# Patient Record
Sex: MALE | Employment: OTHER | URBAN - METROPOLITAN AREA
[De-identification: names, ages, dates, MRNs, and addresses within clinical notes are randomized per-mention and may not be internally consistent; named-entity substitution may affect disease eponyms.]

---

## 2017-03-22 PROCEDURE — 82607 VITAMIN B-12: CPT | Performed by: FAMILY MEDICINE

## 2017-08-16 PROCEDURE — 82607 VITAMIN B-12: CPT | Performed by: FAMILY MEDICINE

## 2018-04-02 PROBLEM — E11.42 DIABETIC POLYNEUROPATHY ASSOCIATED WITH TYPE 2 DIABETES MELLITUS (HCC): Status: ACTIVE | Noted: 2018-04-02

## 2019-02-11 PROCEDURE — 81003 URINALYSIS AUTO W/O SCOPE: CPT | Performed by: FAMILY MEDICINE

## 2019-08-09 PROBLEM — N20.0 KIDNEY STONE ON RIGHT SIDE: Status: ACTIVE | Noted: 2019-08-01

## 2019-08-09 PROCEDURE — 82365 CALCULUS SPECTROSCOPY: CPT | Performed by: FAMILY MEDICINE

## 2022-03-15 PROBLEM — N20.0 KIDNEY STONE ON RIGHT SIDE: Status: RESOLVED | Noted: 2019-08-01 | Resolved: 2022-03-15

## 2022-03-15 PROBLEM — E11.65 TYPE 2 DIABETES MELLITUS WITH HYPERGLYCEMIA, WITHOUT LONG-TERM CURRENT USE OF INSULIN (HCC): Status: ACTIVE | Noted: 2022-03-15

## 2024-05-26 ENCOUNTER — OFFICE VISIT (OUTPATIENT)
Dept: FAMILY MEDICINE CLINIC | Facility: CLINIC | Age: 84
End: 2024-05-26

## 2024-05-26 VITALS
TEMPERATURE: 98 F | RESPIRATION RATE: 16 BRPM | SYSTOLIC BLOOD PRESSURE: 146 MMHG | DIASTOLIC BLOOD PRESSURE: 64 MMHG | BODY MASS INDEX: 29 KG/M2 | HEART RATE: 70 BPM | WEIGHT: 181 LBS | OXYGEN SATURATION: 97 %

## 2024-05-26 DIAGNOSIS — R22.0 CHEEK SWELLING: Primary | ICD-10-CM

## 2024-05-26 PROCEDURE — 1160F RVW MEDS BY RX/DR IN RCRD: CPT | Performed by: NURSE PRACTITIONER

## 2024-05-26 PROCEDURE — 1159F MED LIST DOCD IN RCRD: CPT | Performed by: NURSE PRACTITIONER

## 2024-05-26 PROCEDURE — 3078F DIAST BP <80 MM HG: CPT | Performed by: NURSE PRACTITIONER

## 2024-05-26 PROCEDURE — 3077F SYST BP >= 140 MM HG: CPT | Performed by: NURSE PRACTITIONER

## 2024-05-26 PROCEDURE — 99202 OFFICE O/P NEW SF 15 MIN: CPT | Performed by: NURSE PRACTITIONER

## 2024-05-26 RX ORDER — AMOXICILLIN 500 MG/1
500 CAPSULE ORAL 3 TIMES DAILY
Qty: 30 CAPSULE | Refills: 0 | Status: SHIPPED | OUTPATIENT
Start: 2024-05-26 | End: 2024-06-05

## 2024-05-26 RX ORDER — CHLORHEXIDINE GLUCONATE ORAL RINSE 1.2 MG/ML
15 SOLUTION DENTAL 2 TIMES DAILY
Qty: 473 ML | Refills: 0 | Status: SHIPPED | OUTPATIENT
Start: 2024-05-26 | End: 2024-06-09

## 2024-05-26 NOTE — PATIENT INSTRUCTIONS
Please start Amoxicillin 500 mg three times daily when awake with food for 10 days.  Eat yogurt or take Probiotics to help prevent upset stomach or diarrhea associated with antibiotic use.  Chlorhexadine mouthwash rinse twice daily for up to two weeks.  Motrin 600 mg every 8 hours for pain/swelling  Warm salt water gargles during the day  Soft foods for now  Follow up with dentist on Tuesday as discussed. If swelling worsens, difficulty swallowing or breathing or pain not controlled please go to the emergency room for evaluation.

## 2024-05-26 NOTE — PROGRESS NOTES
Subjective:   Patient ID: Shreyas Stone is a 84 year old male.    Swelling  This is a new problem. The current episode started yesterday. The problem occurs constantly. The problem has been gradually improving.     Patient notes dry mouth chronically due to diabetes, notes two days ago felt some pain/swelling in right lower jaw. Took Motrin with mild temporary relief, awoke this morning with large right lower cheek swelling. Improving since awakening. Denies fever, cough, difficulty swallowing or breathing. Notes increased saliva production. Able to drink and chew normally.    History/Other:   Review of Systems   HENT:          As above in HPI   Skin:         As above in HPI   All other systems reviewed and are negative.    Current Outpatient Medications   Medication Sig Dispense Refill    rosuvastatin 5 MG Oral Tab Take one tablet by mouth every day for cholesterol 90 tablet 3    FLUTICASONE PROPIONATE 50 MCG/ACT Nasal Suspension SHAKE LIQUID AND USE 2  SPRAYS IN BOTH NOSTRILS  ONCE DAILY 48 g 0    LISINOPRIL 10 MG Oral Tab TAKE 1 TABLET BY MOUTH ONCE DAILY 90 tablet 0    METFORMIN HCL 1000 MG Oral Tab TAKE 1 TABLET BY MOUTH  TWICE DAILY WITH MEALS 180 tablet 0    PIOGLITAZONE 45 MG Oral Tab TAKE 1 TABLET BY MOUTH ONCE DAILY 90 tablet 0    ACCU-CHEK SOFTCLIX LANCETS Does not apply Misc USE TO TEST BLOOD SUGAR TWICE DAILY 200 each 3    Glucose Blood (ACCU-CHEK KHRIS PLUS) In Vitro Strip USE TO TEST BLOOD SUGAR TWO TIMES DAILY 200 strip 3    GLIPIZIDE 5 MG Oral Tab TAKE 1 TABLET BY MOUTH  TWICE DAILY BEFORE MEALS 180 tablet 0    Blood Glucose Monitoring Suppl ([employer] CONTOUR NEXT MONITOR) W/DEVICE Does not apply Kit 1 each by Does not apply route 2 (two) times daily. 1 kit 0    Cyanocobalamin (VITAMIN B-12) 2500 MCG Sublingual SL Tab Place 1 tablet under the tongue daily. 1/2 po daily.      PREVACID OR None Entered       Allergies:No Known Allergies    /64   Pulse 70   Temp 98.4 °F (36.9 °C) (Oral)   Resp 16    Wt 181 lb (82.1 kg)   SpO2 97%   BMI 29.44 kg/m²       Objective:   Physical Exam  Constitutional:       General: He is not in acute distress.     Appearance: Normal appearance. He is not ill-appearing.   HENT:      Head: Normocephalic and atraumatic.        Comments: Large fluctuant non tender right lower cheek swelling with no erythema. Mild edema noted below right lip.     Mouth/Throat:      Mouth: Mucous membranes are moist.      Pharynx: Oropharynx is clear. No oropharyngeal exudate or posterior oropharyngeal erythema.        Comments: Missing dentition where marked, mild swelling of gum where marked.  No exudate noted.  Eyes:      Extraocular Movements: Extraocular movements intact.      Pupils: Pupils are equal, round, and reactive to light.   Cardiovascular:      Rate and Rhythm: Normal rate.   Pulmonary:      Effort: Pulmonary effort is normal.   Musculoskeletal:         General: Normal range of motion.      Cervical back: Normal range of motion and neck supple. No tenderness.   Lymphadenopathy:      Cervical: No cervical adenopathy.   Skin:     General: Skin is warm and dry.   Neurological:      General: No focal deficit present.      Mental Status: He is alert.   Psychiatric:         Mood and Affect: Mood normal.         Assessment & Plan:   1. Cheek swelling Acute       No orders of the defined types were placed in this encounter.      Meds This Visit:  Requested Prescriptions     Signed Prescriptions Disp Refills    amoxicillin 500 MG Oral Cap 30 capsule 0     Sig: Take 1 capsule (500 mg total) by mouth 3 (three) times daily for 10 days.    chlorhexidine gluconate 0.12 % Mouth/Throat Solution 473 mL 0     Sig: Use as directed 15 mL in the mouth or throat 2 (two) times daily for 14 days.     Patient Instructions   Please start Amoxicillin 500 mg three times daily when awake with food for 10 days.  Eat yogurt or take Probiotics to help prevent upset stomach or diarrhea associated with antibiotic  use.  Chlorhexadine mouthwash rinse twice daily for up to two weeks.  Motrin 600 mg every 8 hours for pain/swelling  Warm salt water gargles during the day  Soft foods for now  Follow up with dentist on Tuesday as discussed. If swelling worsens, difficulty swallowing or breathing or pain not controlled please go to the emergency room for evaluation.      Medication use and risk/benefit discussed. Oral care discussed. Follow up with dentist as discussed or seek emergency care for worsening symptoms. Patient verbalized understanding and agrees to plan.

## 2025-03-03 RX ORDER — OMEGA-3 FATTY ACIDS/FISH OIL 300-1000MG
1 CAPSULE ORAL DAILY
COMMUNITY

## 2025-03-03 RX ORDER — FERROUS SULFATE 325(65) MG
325 TABLET, DELAYED RELEASE (ENTERIC COATED) ORAL
COMMUNITY

## 2025-03-03 RX ORDER — MULTIVIT-MIN/IRON FUM/FOLIC AC 7.5 MG-4
1 TABLET ORAL DAILY
COMMUNITY

## 2025-03-03 RX ORDER — MAGNESIUM 200 MG
2 TABLET ORAL DAILY
COMMUNITY

## 2025-03-12 ENCOUNTER — ANESTHESIA (OUTPATIENT)
Dept: ENDOSCOPY | Facility: HOSPITAL | Age: 85
End: 2025-03-12
Payer: MEDICARE

## 2025-03-12 ENCOUNTER — ANESTHESIA EVENT (OUTPATIENT)
Dept: ENDOSCOPY | Facility: HOSPITAL | Age: 85
End: 2025-03-12
Payer: MEDICARE

## 2025-03-12 ENCOUNTER — APPOINTMENT (OUTPATIENT)
Dept: GENERAL RADIOLOGY | Facility: HOSPITAL | Age: 85
End: 2025-03-12
Attending: INTERNAL MEDICINE
Payer: MEDICARE

## 2025-03-12 ENCOUNTER — HOSPITAL ENCOUNTER (OUTPATIENT)
Facility: HOSPITAL | Age: 85
Setting detail: HOSPITAL OUTPATIENT SURGERY
Discharge: HOME | End: 2025-03-12
Attending: INTERNAL MEDICINE | Admitting: INTERNAL MEDICINE
Payer: MEDICARE

## 2025-03-12 VITALS
BODY MASS INDEX: 20.57 KG/M2 | HEIGHT: 66 IN | HEART RATE: 53 BPM | SYSTOLIC BLOOD PRESSURE: 169 MMHG | OXYGEN SATURATION: 100 % | TEMPERATURE: 98 F | WEIGHT: 128 LBS | RESPIRATION RATE: 18 BRPM | DIASTOLIC BLOOD PRESSURE: 63 MMHG

## 2025-03-12 LAB — GLUCOSE BLD-MCNC: 176 MG/DL (ref 70–99)

## 2025-03-12 PROCEDURE — 0FC98ZZ EXTIRPATION OF MATTER FROM COMMON BILE DUCT, VIA NATURAL OR ARTIFICIAL OPENING ENDOSCOPIC: ICD-10-PCS | Performed by: INTERNAL MEDICINE

## 2025-03-12 PROCEDURE — 82962 GLUCOSE BLOOD TEST: CPT

## 2025-03-12 PROCEDURE — 74328 X-RAY BILE DUCT ENDOSCOPY: CPT | Performed by: INTERNAL MEDICINE

## 2025-03-12 PROCEDURE — 0F798DZ DILATION OF COMMON BILE DUCT WITH INTRALUMINAL DEVICE, VIA NATURAL OR ARTIFICIAL OPENING ENDOSCOPIC: ICD-10-PCS | Performed by: INTERNAL MEDICINE

## 2025-03-12 DEVICE — COTTON-LEUNG BILIARY STENT
Type: IMPLANTABLE DEVICE | Status: FUNCTIONAL
Brand: COTTON-LEUNG

## 2025-03-12 RX ORDER — DEXTROSE MONOHYDRATE 25 G/50ML
50 INJECTION, SOLUTION INTRAVENOUS
Status: DISCONTINUED | OUTPATIENT
Start: 2025-03-12 | End: 2025-03-12

## 2025-03-12 RX ORDER — SODIUM CHLORIDE, SODIUM LACTATE, POTASSIUM CHLORIDE, CALCIUM CHLORIDE 600; 310; 30; 20 MG/100ML; MG/100ML; MG/100ML; MG/100ML
INJECTION, SOLUTION INTRAVENOUS CONTINUOUS
Status: DISCONTINUED | OUTPATIENT
Start: 2025-03-12 | End: 2025-03-12

## 2025-03-12 RX ORDER — NICOTINE POLACRILEX 4 MG
15 LOZENGE BUCCAL
Status: DISCONTINUED | OUTPATIENT
Start: 2025-03-12 | End: 2025-03-12

## 2025-03-12 RX ORDER — AMPICILLIN SODIUM AND SULBACTAM SODIUM 2; 1 G/1; G/1
INJECTION, POWDER, FOR SOLUTION INTRAVENOUS
Status: DISCONTINUED
Start: 2025-03-12 | End: 2025-03-12 | Stop reason: WASHOUT

## 2025-03-12 RX ORDER — INDOMETHACIN 100 MG
100 SUPPOSITORY, RECTAL RECTAL ONCE
Status: DISCONTINUED | OUTPATIENT
Start: 2025-03-12 | End: 2025-03-12

## 2025-03-12 RX ORDER — INDOMETHACIN 50 MG/1
SUPPOSITORY RECTAL
Status: DISCONTINUED | OUTPATIENT
Start: 2025-03-12 | End: 2025-03-12

## 2025-03-12 RX ORDER — INDOMETHACIN 100 MG
SUPPOSITORY, RECTAL RECTAL
Status: DISCONTINUED
Start: 2025-03-12 | End: 2025-03-12

## 2025-03-12 RX ORDER — LEVOFLOXACIN 5 MG/ML
INJECTION, SOLUTION INTRAVENOUS
Status: DISCONTINUED
Start: 2025-03-12 | End: 2025-03-12 | Stop reason: WASHOUT

## 2025-03-12 RX ORDER — SODIUM CHLORIDE, SODIUM LACTATE, POTASSIUM CHLORIDE, CALCIUM CHLORIDE 600; 310; 30; 20 MG/100ML; MG/100ML; MG/100ML; MG/100ML
100 INJECTION, SOLUTION INTRAVENOUS CONTINUOUS
Status: DISCONTINUED | OUTPATIENT
Start: 2025-03-12 | End: 2025-03-12

## 2025-03-12 RX ORDER — SODIUM CHLORIDE, SODIUM LACTATE, POTASSIUM CHLORIDE, CALCIUM CHLORIDE 600; 310; 30; 20 MG/100ML; MG/100ML; MG/100ML; MG/100ML
INJECTION, SOLUTION INTRAVENOUS CONTINUOUS PRN
Status: DISCONTINUED | OUTPATIENT
Start: 2025-03-12 | End: 2025-03-12 | Stop reason: SURG

## 2025-03-12 RX ORDER — LIDOCAINE HYDROCHLORIDE 10 MG/ML
INJECTION, SOLUTION EPIDURAL; INFILTRATION; INTRACAUDAL; PERINEURAL AS NEEDED
Status: DISCONTINUED | OUTPATIENT
Start: 2025-03-12 | End: 2025-03-12 | Stop reason: SURG

## 2025-03-12 RX ORDER — NICOTINE POLACRILEX 4 MG
30 LOZENGE BUCCAL
Status: DISCONTINUED | OUTPATIENT
Start: 2025-03-12 | End: 2025-03-12

## 2025-03-12 RX ADMIN — LIDOCAINE HYDROCHLORIDE 50 MG: 10 INJECTION, SOLUTION EPIDURAL; INFILTRATION; INTRACAUDAL; PERINEURAL at 14:27:00

## 2025-03-12 RX ADMIN — SODIUM CHLORIDE, SODIUM LACTATE, POTASSIUM CHLORIDE, CALCIUM CHLORIDE: 600; 310; 30; 20 INJECTION, SOLUTION INTRAVENOUS at 14:21:00

## 2025-03-12 NOTE — ANESTHESIA PREPROCEDURE EVALUATION
PRE-OP EVALUATION    Patient Name: Shreyas Stone    Admit Diagnosis: CHOLEDOCHOLITHIASIS    Pre-op Diagnosis: CHOLEDOCHOLITHIASIS    ENDOSCOPIC RETROGRADE CHOLANGIOPANCREATOGRAPHY (ERCP) WITH FLUOROSCOPY    Anesthesia Procedure: ENDOSCOPIC RETROGRADE CHOLANGIOPANCREATOGRAPHY (ERCP) WITH FLUOROSCOPY    Surgeons and Role:     * Severino Mittal MD - Primary    Pre-op vitals reviewed.  Temp: 97.8 °F (36.6 °C)  Pulse: 64  Resp: 18  BP: 131/63  SpO2: 100 %  Body mass index is 20.66 kg/m².    Current medications reviewed.  Hospital Medications:   glucose (Dex4) 15 GM/59ML oral liquid 15 g  15 g Oral Q15 Min PRN    Or    glucose (Glutose) 40% oral gel 15 g  15 g Oral Q15 Min PRN    Or    glucose-vitamin C (Dex-4) chewable tab 4 tablet  4 tablet Oral Q15 Min PRN    Or    dextrose 50% injection 50 mL  50 mL Intravenous Q15 Min PRN    Or    glucose (Dex4) 15 GM/59ML oral liquid 30 g  30 g Oral Q15 Min PRN    Or    glucose (Glutose) 40% oral gel 30 g  30 g Oral Q15 Min PRN    Or    glucose-vitamin C (Dex-4) chewable tab 8 tablet  8 tablet Oral Q15 Min PRN    lactated ringers infusion   Intravenous Continuous    ampicillin-sulbactam (Unasyn) 3 g in sodium chloride 0.9% 100mL IVPB-ADD  3 g Intravenous Once    lactated ringers IV bolus 1,000 mL  1,000 mL Intravenous Once    Followed by    lactated ringers infusion  100 mL/hr Intravenous Continuous    indomethacin (Indocin) 100 MG rectal suppository 100 mg  100 mg Rectal Once    indomethacin (Indocin) 100 MG rectal suppository           Outpatient Medications:   Prescriptions Prior to Admission[1]    Allergies: Patient has no known allergies.      Anesthesia Evaluation    Patient summary reviewed.    Anesthetic Complications  (-) history of anesthetic complications         GI/Hepatic/Renal      (+) GERD                           Cardiovascular        Exercise tolerance: good     MET: >4      (+) hypertension   (+) hyperlipidemia                                   Endo/Other      (+) diabetes  type 2, not using insulin                         Pulmonary                    (+) sleep apnea       Neuro/Psych    Negative neuro/psych ROS.                                  Past Surgical History:   Procedure Laterality Date    Colonoscopy  2010    normal;     Colonoscopy  8-    divertic, int hem; repeat in 10 yrs    Other surgical history      circumcision    Upper gi endoscopy - referral  2010    H pylori    Upper gi endoscopy - referral  8-2014, HH    [facility]? Dr Ling     Social History     Socioeconomic History    Marital status:    Tobacco Use    Smoking status: Former     Current packs/day: 0.50     Average packs/day: 0.5 packs/day for 10.0 years (5.0 ttl pk-yrs)     Types: Cigarettes    Smokeless tobacco: Never    Tobacco comments:     quit over 20 years   Vaping Use    Vaping status: Never Used   Substance and Sexual Activity    Alcohol use: Yes     Comment: occasional    Drug use: No     History   Drug Use No     Available pre-op labs reviewed.               Airway      Mallampati: II  Mouth opening: >3 FB  TM distance: > 6 cm  Neck ROM: full Cardiovascular    Cardiovascular exam normal.  Rhythm: regular  Rate: normal     Dental    Dentition appears grossly intact         Pulmonary    Pulmonary exam normal.  Breath sounds clear to auscultation bilaterally.               Other findings              ASA: 2   Plan: MAC  NPO status verified and patient meets guidelines.  Patient has not taken beta blockers in last 24 hours.  Post-procedure pain management plan discussed with surgeon and patient.      Plan/risks discussed with: patient                Present on Admission:  **None**             [1]   Medications Prior to Admission   Medication Sig Dispense Refill Last Dose/Taking    Omega 3 1000 MG Oral Cap Take 1 capsule by mouth daily.   3/11/2025 Morning    Magnesium 200 MG Oral Tab Take 2 tablets (400 mg total) by mouth daily.   3/11/2025 Morning    Multiple  Vitamins-Minerals (MULTI-VITAMIN/MINERALS) Oral Tab Take 1 tablet by mouth daily.   3/11/2025 Morning    ferrous sulfate 325 (65 FE) MG Oral Tab EC Take 1 tablet (325 mg total) by mouth daily with breakfast.   3/11/2025 Morning    rosuvastatin 5 MG Oral Tab Take one tablet by mouth every day for cholesterol 90 tablet 3 3/11/2025 Bedtime    FLUTICASONE PROPIONATE 50 MCG/ACT Nasal Suspension SHAKE LIQUID AND USE 2  SPRAYS IN BOTH NOSTRILS  ONCE DAILY (Patient taking differently: SHAKE LIQUID AND USE 2  SPRAYS IN BOTH NOSTRILS ONCE DAILY PRN) 48 g 0 Past Month    LISINOPRIL 10 MG Oral Tab TAKE 1 TABLET BY MOUTH ONCE DAILY 90 tablet 0 3/12/2025    METFORMIN HCL 1000 MG Oral Tab TAKE 1 TABLET BY MOUTH  TWICE DAILY WITH MEALS 180 tablet 0 3/11/2025 Morning    PIOGLITAZONE 45 MG Oral Tab TAKE 1 TABLET BY MOUTH ONCE DAILY 90 tablet 0 3/11/2025 Morning    GLIPIZIDE 5 MG Oral Tab TAKE 1 TABLET BY MOUTH  TWICE DAILY BEFORE MEALS 180 tablet 0 3/11/2025 Morning    Blood Glucose Monitoring Suppl (TG CONTOUR NEXT MONITOR) W/DEVICE Does not apply Kit 1 each by Does not apply route 2 (two) times daily. 1 kit 0 3/11/2025    Cyanocobalamin (VITAMIN B-12) 2500 MCG Sublingual SL Tab Place 1 tablet under the tongue daily. 1/2 po daily.   3/11/2025 Morning    PREVACID OR Take 1 capsule by mouth daily.   3/11/2025 Morning    ACCU-CHEK SOFTCLIX LANCETS Does not apply Misc USE TO TEST BLOOD SUGAR TWICE DAILY 200 each 3     Glucose Blood (ACCU-CHEK KHRIS PLUS) In Vitro Strip USE TO TEST BLOOD SUGAR TWO TIMES DAILY 200 strip 3

## 2025-03-12 NOTE — H&P
History & Physical Examination    Patient Name: Shreyas Stone  MRN: [id number]  CSN: [id number]  YOB: 1940    Diagnosis: CHOLEDOCHOLITHIASIS        Present Illness:  Shreyas Stone is a 84 year old male is here CHOLEDOCHOLITHIASIS.    Body mass index is 20.66 kg/m².    Past Medical History:    DIABETES    Diabetes (HCC)    Erectile dysfunction    GERD (gastroesophageal reflux disease)    [facility]? Dr Ling    Hearing impaired person, bilateral    left side hearing loss    High blood pressure    High cholesterol    History of anemia    iron def - etiology ?    HYPERLIPIDEMIA    Hypertension    Impotence of organic origin    Iron deficiency anemia, unspecified    Kidney stone on right side    Restless leg syndrome     ?Rey    SLEEP APNEA    Sleep apnea    no device or oral appliance    Visual impairment    glasses    Vitamin B12 deficiency neuropathy (HCC)       Procedure: ERCP    Physician Pre-Sedation Assessment    Pre-Sedation Assessment:    Sedation History: Airway Assessed    ASA Classification: 2. Patient with mild systemic disease    Cardiac:    Respiratory:    Abdomen:      Plan: MAC        Current Facility-Administered Medications   Medication Dose Route Frequency    indomethacin (Indocin) 100 MG rectal suppository        glucose (Dex4) 15 GM/59ML oral liquid 15 g  15 g Oral Q15 Min PRN    Or    glucose (Glutose) 40% oral gel 15 g  15 g Oral Q15 Min PRN    Or    glucose-vitamin C (Dex-4) chewable tab 4 tablet  4 tablet Oral Q15 Min PRN    Or    dextrose 50% injection 50 mL  50 mL Intravenous Q15 Min PRN    Or    glucose (Dex4) 15 GM/59ML oral liquid 30 g  30 g Oral Q15 Min PRN    Or    glucose (Glutose) 40% oral gel 30 g  30 g Oral Q15 Min PRN    Or    glucose-vitamin C (Dex-4) chewable tab 8 tablet  8 tablet Oral Q15 Min PRN    lactated ringers infusion   Intravenous Continuous    ampicillin-sulbactam (Unasyn) 3 g in sodium chloride 0.9% 100mL IVPB-ADD  3 g Intravenous Once    lactated  ringers IV bolus 1,000 mL  1,000 mL Intravenous Once    Followed by    lactated ringers infusion  100 mL/hr Intravenous Continuous    indomethacin (Indocin) 100 MG rectal suppository 100 mg  100 mg Rectal Once       Allergies: Allergies[1]    Past Surgical History:   Procedure Laterality Date    Colonoscopy  2010    normal;     Colonoscopy  8-    divertic, int hem; repeat in 10 yrs    Other surgical history      circumcision    Upper gi endoscopy - referral  2010    H pylori    Upper gi endoscopy - referral  8-2014, HH    [facility]? Dr Ling     Family History   Problem Relation Age of Onset    Diabetes Maternal Grandmother     Diabetes Maternal Grandfather     Diabetes Other     Cancer Neg     Hypertension Neg     Lipids Neg     Heart Disorder Neg     Stroke Neg      Social History     Tobacco Use    Smoking status: Former     Current packs/day: 0.50     Average packs/day: 0.5 packs/day for 10.0 years (5.0 ttl pk-yrs)     Types: Cigarettes    Smokeless tobacco: Never    Tobacco comments:     quit over 20 years   Substance Use Topics    Alcohol use: Yes     Comment: occasional       SYSTEM Check if Review is Normal Check if Physical Exam is Normal If not normal, please explain:   HEENT [x ] [x ]    NECK & BACK [x ] [x ]    HEART [x ] [x ]    LUNGS [x ] [x ]    ABDOMEN [x ] [x ]    UROGENITAL [ ] [ ]    EXTREMITIES [ ] [ ]    OTHER        [ x ] I have discussed the risks and benefits and alternatives with the patient/family.  They understand and agree to proceed with plan of care.  [ x ] I have reviewed the History and Physical done within the last 30 days.  Any changes noted above.    Severino Mittal MD  3/12/2025  2:03 PM             [1] No Known Allergies

## 2025-03-12 NOTE — ANESTHESIA POSTPROCEDURE EVALUATION
[facility]    Shreyas Stone Patient Status:  Hospital Outpatient Surgery   Age/Gender 84 year old male MRN [id number]   Location [facility] Attending Severino Mittal MD   Hosp Day # 0 PCP Adryan Rodarte MD       Anesthesia Post-op Note    ENDOSCOPIC RETROGRADE CHOLANGIOPANCREATOGRAPHY (ERCP) WITH SPHINCTEROTOMY, BALLOON SWEEP, 6-8MM DILATION, TRAPEZOID BASKET STONE REMOVAL    Procedure Summary       Date: 03/12/25 Room / Location:  ENDOSCOPY 01 /  ENDOSCOPY    Anesthesia Start: 1421 Anesthesia Stop: 1542    Procedure: ENDOSCOPIC RETROGRADE CHOLANGIOPANCREATOGRAPHY (ERCP) WITH SPHINCTEROTOMY, BALLOON SWEEP, 6-8MM DILATION, TRAPEZOID BASKET STONE REMOVAL Diagnosis: (CHOLEDOCOLITHASIS)    Surgeons: Severino Mittal MD Anesthesiologist: Russ Moran MD    Anesthesia Type: MAC ASA Status: 2            Anesthesia Type: MAC    Vitals Value Taken Time   /58 03/12/25 1542        Pulse 60 03/12/25 1542   Resp 16 03/12/25 1542   SpO2 100 % 03/12/25 1542   Vitals shown include unfiled device data.        Patient Location: Endoscopy    Anesthesia Type: MAC    Airway Patency: patent    Postop Pain Control: adequate    Mental Status: mildly sedated but able to meaningfully participate in the post-anesthesia evaluation    Nausea/Vomiting: none    Cardiopulmonary/Hydration status: stable euvolemic    Complications: no apparent anesthesia related complications    Postop vital signs: stable    Dental Exam: Unchanged from Preop    Patient to be discharged home when criteria met.

## 2025-03-13 NOTE — OPERATIVE REPORT
[facility] OPERATIVE REPORT   PATIENT NAME: Shreyas Stone  MRN: [id number]  DATE OF OPERATION: 3/12/2025  PREOPERATIVE DIAGNOSIS: upper abdominal pain, dilated CBD with multiple stones in CBD c/w choledocholithiasis; cholelithiasis   POSTOPERATIVE DIAGNOSIS:    1.  Severely redundant duodenal folds made if difficult to find the papilla   2.  Selective biliary cannulation, dilated CBD with large CBD stones   3.  Sphincterotomy, balloon papillotomy to 8mm, mechanical lithotripsy, stone extraction with basket and balloon  PROCEDURE PERFORMED: Endoscopic Retrograde Cholangiopancreatoscopy   SEDATION MEDICATIONS: MAC  PREOPERATIVE MEDS:  Levofloxacin 500 mg IVPB;  500cc lactated Ringer's solution IV  INTRAPROCEDURE MEDS:  indomethacin 100 mg Per rectum  PREPROCEDURE ASSESSMENT: The indication for this procedure is to assess for choledocholithiasis. The patient was identified by myself and nursing staff in the exam room. Informed consent was obtained. The patient was seen in clinic and a full H&P was obtained. On brief physical examination, airway is patent. Chest is clear. Heart has regular rate and rhythm. Abdomen is soft, nontender with good bowel sounds. A medication list was taken by nursing today and reviewed by myself. The patient is an ASA grade 2.   PROCEDURE NOTE: The procedure was completed with difficulty. The patient tolerated the procedure well.   The side-viewing duodenoscope was introduced into the esophagus and advanced to the 2nd portion. Ampulla was hard to find due to redundant folds. After much time, bile drainage was noted under buried folds. Folds were teased apart to find the papilla. Ampulla was normal in appearance. Biliary cannulation was made with autotome with 035\" wire. Contrast injection revealed a very dilated CBD of 15mm. Large stones were appreciated- largest was 10 x 20mm.  Sphincterotomy was carefully made and due to redundant folds, it was hard to know the extent of duodenal wall -  balloon papillotomy was performed to facilitate stone removal starting from 6 mm to 8mm.  A 2.5cm trapezoid basket was used to capture and crush the largest stone. Fragments were removed with open basket sweep. Most stones fragments were removed this way. Smaller stones were removed with 12-15mm. All stones were removed. Occlusion cholangiogram was performed and no filling defects were noted.  Excellent biliary drainage was noted.  No contrast was noted in the cystic duct and pancreatic duct. A 10fr x 9cm bilairy stent was placed to allow drainage of bile. Scope was withdrawn from the patient and patient tolerated the procedure well.  FINDINGS   Choledocholithiasis s/p stone extraction   Stent was placed to prevent post extraction edema and subsequent biliary obstruction   RECOMMENDATIONS: proceed with cholecystectomy with Dr. Gross.  Repeat ERCP in 6-8 weeks to remove the stent  DISCHARGE:  On discharge, the patient was given an after-visit summary detailing the procedure, findings, followup plans, and an updated medication list.     Thank you very much for the consultation.  I really appreciate it.    Severino Mittal MD

## 2025-03-20 ENCOUNTER — LABORATORY ENCOUNTER (OUTPATIENT)
Dept: LAB | Age: 85
End: 2025-03-20
Attending: SURGERY
Payer: MEDICARE

## 2025-03-20 ENCOUNTER — EKG ENCOUNTER (OUTPATIENT)
Dept: LAB | Age: 85
End: 2025-03-20
Attending: SURGERY
Payer: MEDICARE

## 2025-03-20 DIAGNOSIS — Z01.818 PRE-OP TESTING: ICD-10-CM

## 2025-03-20 DIAGNOSIS — E11.65 TYPE 2 DIABETES MELLITUS WITH HYPERGLYCEMIA, WITHOUT LONG-TERM CURRENT USE OF INSULIN (HCC): ICD-10-CM

## 2025-03-20 LAB
ATRIAL RATE: 72 BPM
ERYTHROCYTE [DISTWIDTH] IN BLOOD BY AUTOMATED COUNT: 13.1 %
HCT VFR BLD AUTO: 43.9 %
HGB BLD-MCNC: 14.3 G/DL
MCH RBC QN AUTO: 30.4 PG (ref 26–34)
MCHC RBC AUTO-ENTMCNC: 32.6 G/DL (ref 31–37)
MCV RBC AUTO: 93.4 FL
P AXIS: 49 DEGREES
P-R INTERVAL: 186 MS
PLATELET # BLD AUTO: 292 10(3)UL (ref 150–450)
Q-T INTERVAL: 374 MS
QRS DURATION: 84 MS
QTC CALCULATION (BEZET): 409 MS
R AXIS: 32 DEGREES
RBC # BLD AUTO: 4.7 X10(6)UL
T AXIS: 26 DEGREES
VENTRICULAR RATE: 72 BPM
WBC # BLD AUTO: 5.8 X10(3) UL (ref 4–11)

## 2025-03-20 PROCEDURE — 93005 ELECTROCARDIOGRAM TRACING: CPT

## 2025-03-20 PROCEDURE — 36415 COLL VENOUS BLD VENIPUNCTURE: CPT

## 2025-03-20 PROCEDURE — 93010 ELECTROCARDIOGRAM REPORT: CPT | Performed by: INTERNAL MEDICINE

## 2025-03-20 PROCEDURE — 85027 COMPLETE CBC AUTOMATED: CPT

## 2025-03-31 ENCOUNTER — HOSPITAL ENCOUNTER (OUTPATIENT)
Facility: HOSPITAL | Age: 85
Setting detail: HOSPITAL OUTPATIENT SURGERY
Discharge: HOME | End: 2025-03-31
Attending: SURGERY | Admitting: SURGERY
Payer: MEDICARE

## 2025-03-31 ENCOUNTER — ANESTHESIA EVENT (OUTPATIENT)
Dept: SURGERY | Facility: HOSPITAL | Age: 85
End: 2025-03-31
Payer: MEDICARE

## 2025-03-31 ENCOUNTER — ANESTHESIA (OUTPATIENT)
Dept: SURGERY | Facility: HOSPITAL | Age: 85
End: 2025-03-31
Payer: MEDICARE

## 2025-03-31 VITALS
SYSTOLIC BLOOD PRESSURE: 114 MMHG | HEIGHT: 67 IN | TEMPERATURE: 97 F | RESPIRATION RATE: 16 BRPM | DIASTOLIC BLOOD PRESSURE: 58 MMHG | HEART RATE: 63 BPM | BODY MASS INDEX: 27 KG/M2 | OXYGEN SATURATION: 97 % | WEIGHT: 172 LBS

## 2025-03-31 DIAGNOSIS — Z01.818 PRE-OP TESTING: Primary | ICD-10-CM

## 2025-03-31 DIAGNOSIS — R52 PAIN: ICD-10-CM

## 2025-03-31 DIAGNOSIS — E11.65 TYPE 2 DIABETES MELLITUS WITH HYPERGLYCEMIA, WITHOUT LONG-TERM CURRENT USE OF INSULIN (HCC): ICD-10-CM

## 2025-03-31 LAB
GLUCOSE BLD-MCNC: 180 MG/DL (ref 70–99)
GLUCOSE BLD-MCNC: 240 MG/DL (ref 70–99)

## 2025-03-31 PROCEDURE — 82962 GLUCOSE BLOOD TEST: CPT

## 2025-03-31 PROCEDURE — 88304 TISSUE EXAM BY PATHOLOGIST: CPT | Performed by: SURGERY

## 2025-03-31 PROCEDURE — 0FT44ZZ RESECTION OF GALLBLADDER, PERCUTANEOUS ENDOSCOPIC APPROACH: ICD-10-PCS | Performed by: SURGERY

## 2025-03-31 PROCEDURE — BF53200 OTHER IMAGING OF GALLBLADDER AND BILE DUCTS USING FLUORESCING AGENT, INDOCYANINE GREEN DYE, INTRAOPERATIVE: ICD-10-PCS | Performed by: SURGERY

## 2025-03-31 PROCEDURE — 8E0W4CZ ROBOTIC ASSISTED PROCEDURE OF TRUNK REGION, PERCUTANEOUS ENDOSCOPIC APPROACH: ICD-10-PCS | Performed by: SURGERY

## 2025-03-31 RX ORDER — NICOTINE POLACRILEX 4 MG
30 LOZENGE BUCCAL
Status: DISCONTINUED | OUTPATIENT
Start: 2025-03-31 | End: 2025-03-31 | Stop reason: HOSPADM

## 2025-03-31 RX ORDER — NEOSTIGMINE METHYLSULFATE 1 MG/ML
INJECTION INTRAVENOUS AS NEEDED
Status: DISCONTINUED | OUTPATIENT
Start: 2025-03-31 | End: 2025-03-31 | Stop reason: SURG

## 2025-03-31 RX ORDER — SODIUM CHLORIDE, SODIUM LACTATE, POTASSIUM CHLORIDE, CALCIUM CHLORIDE 600; 310; 30; 20 MG/100ML; MG/100ML; MG/100ML; MG/100ML
INJECTION, SOLUTION INTRAVENOUS CONTINUOUS
Status: DISCONTINUED | OUTPATIENT
Start: 2025-03-31 | End: 2025-03-31

## 2025-03-31 RX ORDER — GLYCOPYRROLATE 0.2 MG/ML
INJECTION, SOLUTION INTRAMUSCULAR; INTRAVENOUS AS NEEDED
Status: DISCONTINUED | OUTPATIENT
Start: 2025-03-31 | End: 2025-03-31 | Stop reason: SURG

## 2025-03-31 RX ORDER — OXYCODONE AND ACETAMINOPHEN 5; 325 MG/1; MG/1
1 TABLET ORAL EVERY 4 HOURS PRN
Qty: 30 TABLET | Refills: 0 | Status: SHIPPED | OUTPATIENT
Start: 2025-03-31 | End: 2025-04-10

## 2025-03-31 RX ORDER — ONDANSETRON 2 MG/ML
INJECTION INTRAMUSCULAR; INTRAVENOUS AS NEEDED
Status: DISCONTINUED | OUTPATIENT
Start: 2025-03-31 | End: 2025-03-31 | Stop reason: SURG

## 2025-03-31 RX ORDER — NALOXONE HYDROCHLORIDE 0.4 MG/ML
0.08 INJECTION, SOLUTION INTRAMUSCULAR; INTRAVENOUS; SUBCUTANEOUS AS NEEDED
Status: DISCONTINUED | OUTPATIENT
Start: 2025-03-31 | End: 2025-03-31

## 2025-03-31 RX ORDER — LABETALOL HYDROCHLORIDE 5 MG/ML
INJECTION, SOLUTION INTRAVENOUS
Status: DISCONTINUED
Start: 2025-03-31 | End: 2025-03-31 | Stop reason: WASHOUT

## 2025-03-31 RX ORDER — NICOTINE POLACRILEX 4 MG
15 LOZENGE BUCCAL
Status: DISCONTINUED | OUTPATIENT
Start: 2025-03-31 | End: 2025-03-31

## 2025-03-31 RX ORDER — BUPIVACAINE HYDROCHLORIDE AND EPINEPHRINE 5; 5 MG/ML; UG/ML
INJECTION, SOLUTION EPIDURAL; INTRACAUDAL; PERINEURAL AS NEEDED
Status: DISCONTINUED | OUTPATIENT
Start: 2025-03-31 | End: 2025-03-31 | Stop reason: HOSPADM

## 2025-03-31 RX ORDER — HYDRALAZINE HYDROCHLORIDE 20 MG/ML
INJECTION INTRAMUSCULAR; INTRAVENOUS
Status: COMPLETED
Start: 2025-03-31 | End: 2025-03-31

## 2025-03-31 RX ORDER — INSULIN ASPART 100 [IU]/ML
INJECTION, SOLUTION INTRAVENOUS; SUBCUTANEOUS
Status: COMPLETED
Start: 2025-03-31 | End: 2025-03-31

## 2025-03-31 RX ORDER — HYDROMORPHONE HYDROCHLORIDE 1 MG/ML
0.6 INJECTION, SOLUTION INTRAMUSCULAR; INTRAVENOUS; SUBCUTANEOUS EVERY 5 MIN PRN
Status: DISCONTINUED | OUTPATIENT
Start: 2025-03-31 | End: 2025-03-31

## 2025-03-31 RX ORDER — LIDOCAINE HYDROCHLORIDE 10 MG/ML
INJECTION, SOLUTION EPIDURAL; INFILTRATION; INTRACAUDAL; PERINEURAL AS NEEDED
Status: DISCONTINUED | OUTPATIENT
Start: 2025-03-31 | End: 2025-03-31 | Stop reason: SURG

## 2025-03-31 RX ORDER — DEXTROSE MONOHYDRATE 25 G/50ML
50 INJECTION, SOLUTION INTRAVENOUS
Status: DISCONTINUED | OUTPATIENT
Start: 2025-03-31 | End: 2025-03-31 | Stop reason: HOSPADM

## 2025-03-31 RX ORDER — ACETAMINOPHEN 500 MG
1000 TABLET ORAL ONCE AS NEEDED
Status: DISCONTINUED | OUTPATIENT
Start: 2025-03-31 | End: 2025-03-31

## 2025-03-31 RX ORDER — INSULIN ASPART 100 [IU]/ML
INJECTION, SOLUTION INTRAVENOUS; SUBCUTANEOUS ONCE
Status: COMPLETED | OUTPATIENT
Start: 2025-03-31 | End: 2025-03-31

## 2025-03-31 RX ORDER — HYDROMORPHONE HYDROCHLORIDE 1 MG/ML
INJECTION, SOLUTION INTRAMUSCULAR; INTRAVENOUS; SUBCUTANEOUS
Status: COMPLETED
Start: 2025-03-31 | End: 2025-03-31

## 2025-03-31 RX ORDER — DEXTROSE MONOHYDRATE 25 G/50ML
50 INJECTION, SOLUTION INTRAVENOUS
Status: DISCONTINUED | OUTPATIENT
Start: 2025-03-31 | End: 2025-03-31

## 2025-03-31 RX ORDER — INDOCYANINE GREEN AND WATER 25 MG
5 KIT INJECTION ONCE
Status: COMPLETED | OUTPATIENT
Start: 2025-03-31 | End: 2025-03-31

## 2025-03-31 RX ORDER — LABETALOL HYDROCHLORIDE 5 MG/ML
5 INJECTION, SOLUTION INTRAVENOUS EVERY 5 MIN PRN
Status: DISCONTINUED | OUTPATIENT
Start: 2025-03-31 | End: 2025-03-31

## 2025-03-31 RX ORDER — HYDROCODONE BITARTRATE AND ACETAMINOPHEN 5; 325 MG/1; MG/1
1 TABLET ORAL ONCE AS NEEDED
Status: DISCONTINUED | OUTPATIENT
Start: 2025-03-31 | End: 2025-03-31

## 2025-03-31 RX ORDER — HYDROCODONE BITARTRATE AND ACETAMINOPHEN 5; 325 MG/1; MG/1
2 TABLET ORAL ONCE AS NEEDED
Status: DISCONTINUED | OUTPATIENT
Start: 2025-03-31 | End: 2025-03-31

## 2025-03-31 RX ORDER — ACETAMINOPHEN 500 MG
1000 TABLET ORAL ONCE
Status: DISCONTINUED | OUTPATIENT
Start: 2025-03-31 | End: 2025-03-31 | Stop reason: HOSPADM

## 2025-03-31 RX ORDER — HYDRALAZINE HYDROCHLORIDE 20 MG/ML
5 INJECTION INTRAMUSCULAR; INTRAVENOUS AS NEEDED
Status: DISCONTINUED | OUTPATIENT
Start: 2025-03-31 | End: 2025-03-31

## 2025-03-31 RX ORDER — NICOTINE POLACRILEX 4 MG
30 LOZENGE BUCCAL
Status: DISCONTINUED | OUTPATIENT
Start: 2025-03-31 | End: 2025-03-31

## 2025-03-31 RX ORDER — HEPARIN SODIUM 5000 [USP'U]/ML
5000 INJECTION, SOLUTION INTRAVENOUS; SUBCUTANEOUS ONCE
Status: COMPLETED | OUTPATIENT
Start: 2025-03-31 | End: 2025-03-31

## 2025-03-31 RX ORDER — HYDROMORPHONE HYDROCHLORIDE 1 MG/ML
0.4 INJECTION, SOLUTION INTRAMUSCULAR; INTRAVENOUS; SUBCUTANEOUS EVERY 5 MIN PRN
Status: DISCONTINUED | OUTPATIENT
Start: 2025-03-31 | End: 2025-03-31

## 2025-03-31 RX ORDER — HYDROMORPHONE HYDROCHLORIDE 1 MG/ML
0.2 INJECTION, SOLUTION INTRAMUSCULAR; INTRAVENOUS; SUBCUTANEOUS EVERY 5 MIN PRN
Status: DISCONTINUED | OUTPATIENT
Start: 2025-03-31 | End: 2025-03-31

## 2025-03-31 RX ORDER — NICOTINE POLACRILEX 4 MG
15 LOZENGE BUCCAL
Status: DISCONTINUED | OUTPATIENT
Start: 2025-03-31 | End: 2025-03-31 | Stop reason: HOSPADM

## 2025-03-31 RX ORDER — ROCURONIUM BROMIDE 10 MG/ML
INJECTION, SOLUTION INTRAVENOUS AS NEEDED
Status: DISCONTINUED | OUTPATIENT
Start: 2025-03-31 | End: 2025-03-31 | Stop reason: SURG

## 2025-03-31 RX ADMIN — SODIUM CHLORIDE, SODIUM LACTATE, POTASSIUM CHLORIDE, CALCIUM CHLORIDE: 600; 310; 30; 20 INJECTION, SOLUTION INTRAVENOUS at 11:06:00

## 2025-03-31 RX ADMIN — ROCURONIUM BROMIDE 50 MG: 10 INJECTION, SOLUTION INTRAVENOUS at 10:05:00

## 2025-03-31 RX ADMIN — NEOSTIGMINE METHYLSULFATE 4 MG: 1 INJECTION INTRAVENOUS at 10:49:00

## 2025-03-31 RX ADMIN — ONDANSETRON 4 MG: 2 INJECTION INTRAMUSCULAR; INTRAVENOUS at 10:49:00

## 2025-03-31 RX ADMIN — LIDOCAINE HYDROCHLORIDE 50 MG: 10 INJECTION, SOLUTION EPIDURAL; INFILTRATION; INTRACAUDAL; PERINEURAL at 10:04:00

## 2025-03-31 RX ADMIN — SODIUM CHLORIDE, SODIUM LACTATE, POTASSIUM CHLORIDE, CALCIUM CHLORIDE: 600; 310; 30; 20 INJECTION, SOLUTION INTRAVENOUS at 09:57:00

## 2025-03-31 RX ADMIN — GLYCOPYRROLATE 0.8 MG: 0.2 INJECTION, SOLUTION INTRAMUSCULAR; INTRAVENOUS at 10:49:00

## 2025-03-31 NOTE — H&P
HPI:    Shreyas Stone is a 84 year old male who presents for evaluation of cholelithiasis. Patient was found on physical exam and screening to have elevated liver function test. He underwent a CT scan, ultrasound and MRI. This revealed cholelithiasis with choledocholithiasis. Patient had a normal bilirubin. Patient denies any significant symptoms of biliary colic. He does describe some occasional indigestion.    Past Medical History:   Diagnosis Date   Diabetes (HCC)   Erectile dysfunction   GERD (gastroesophageal reflux disease)   [facility]? Dr Ling   History of anemia 2009, 2014   iron def - etiology ?   HYPERLIPIDEMIA   Hypertension   Impotence of organic origin 11/21/2007   Iron deficiency anemia, unspecified 11/21/2007   Kidney stone on right side 08/2019   Restless leg syndrome    ?, Rey   SLEEP APNEA   Vitamin B12 deficiency neuropathy (HCC) 12/12/2016     Past Surgical History:   Procedure Laterality Date   COLONOSCOPY 2010   normal;   COLONOSCOPY 8-   divertic, int hem; repeat in 10 yrs   OTHER SURGICAL HISTORY   circumcision   UPPER GI ENDOSCOPY - REFERRAL 2010   H pylori   UPPER GI ENDOSCOPY - REFERRAL 8-2014, HH   [facility]? Dr Ling     Current Outpatient Medications   Medication Sig Dispense Refill   LISINOPRIL 10 MG Oral Tab TAKE 1 TABLET BY MOUTH ONCE DAILY FOR BLOOD PRESSURE 90 tablet 3   glipiZIDE ER 5 MG Oral Tablet 24 Hr Take one tablet by mouth twice every day with food for diabetes 180 tablet 1   pioglitazone 45 MG Oral Tab Take one tablet by mouth every day for diabetes 90 tablet 1   metFORMIN HCl 1000 MG Oral Tab Take 1 tablet (1,000 mg total) by mouth 2 (two) times daily with meals. For diabetes. 180 tablet 1   Accu-Chek Softclix Lancets Does not apply Misc 1 lancet. by Finger stick route 2 (two) times daily. 200 each 5   meclizine 25 MG Oral Tab TAKE 1 TABLET BY MOUTH EVERY 6 HOURS IF NEEDED FOR DIZZINESS 30 tablet 1   rosuvastatin 5 MG Oral Tab Take 1 tablet (5 mg total) by  mouth daily. 90 tablet 2   rOPINIRole 5 MG Oral Tab 1 tablet Orally Three times a day   Omeprazole Magnesium (PRILOSEC OTC) 20 MG Oral Tab EC Take 1 tablet by mouth daily.   Ferrous Sulfate (IRON) 325 (65 Fe) MG Oral Tab 1 tablet Orally twice a day   Glucose Blood (ACCU-CHEK KHRIS PLUS) In Vitro Strip 1 strip by In Vitro route 2 (two) times daily. 200 strip 3   fluticasone propionate 50 MCG/ACT Nasal Suspension SHAKE LIQUID AND USE 2 SPRAYS IN BOTH NOSTRILS ONCE DAILY 48 g 2   Blood Glucose Monitoring Suppl ([employer] CONTOUR NEXT MONITOR) W/DEVICE Does not apply Kit 1 each by Does not apply route 2 (two) times daily. 1 kit 0   Cyanocobalamin (VITAMIN B-12) 2500 MCG Sublingual SL Tab Place 1 tablet under the tongue daily. 1/2 po daily.     No Known Allergies    Family History   Problem Relation Age of Onset   Diabetes Maternal Grandmother   Diabetes Maternal Grandfather   Diabetes Other   Cancer Neg   Hypertension Neg   Lipids Neg   Heart Disorder Neg   Stroke Neg     Social History  Tobacco Use  Smoking status: Former  Packs/day: 0.50  Years: 0.5 packs/day for 10.0 years (5.0 ttl pk-yrs)  Types: Cigarettes  Smokeless tobacco: Never  Tobacco comments: quit over 20 years  Alcohol use: Yes  Alcohol/week: 0.0 standard drinks of alcohol  Comment: occ  Drug use: No    ROS:    10 point review of systems performed with pertinent positives and negatives per history of present illness    GENERAL: well developed, well nourished male, in no apparent distress  SKIN: anicteric  HEENT: normocephalic, sclera aniteric  NECK: supple, no JVD  RESPIRATORY: clear to auscultation  CARDIOVASCULAR: RRR  ABDOMEN: normal active BS, soft and no tenderness, no mass  LYMPHATIC: no lymphadenopathy  EXTREMITIES: no cyanosis or edema    IMPRESSION/PLAN:    Cholelithiasis with choledocholithiasis: Will refer to gastroenterology for ERCP. Patient will then require robotic cholecystectomy. The risk, benefits and alternatives to surgery were discussed in  detail. Patient voiced understanding and willing to proceed

## 2025-03-31 NOTE — OPERATIVE REPORT
Report of Operation    Shreyas Stone Patient Status:  Hospital Outpatient Surgery    3/13/1940 MRN [id number]   [facility] SURGERY Attending Rich Gross MD   Hosp Day # 0 PCP Adryan Rodarte MD         3/31/2025    Shreyasger Tavarez Chase    PRE-OPERATIVE DIAGNOSIS:                      Cholecystitis with cholelithiasis    POST-OPERATIVE DIAGNOSIS:                    Cholecystitis with cholelithiasis    PROCEDURE:                                                Robotic-assisted  Cholecystectomy    SURGEON:                                                    Rich Gross MD    ASSISTANT:                                                  Eula Rajan sa    A surgical assistant was medically necessary and needed for the entire procedure to help with positioning, prepping, instrument passing and holding, opening, closing, and suturing.      ANESTHESIA:                                                General    EBL:                                                               5cc          PROCEDURE:                 Patient was taken to the operating room after informed consent and proper identification. Placed on the operating room table and administered a general anesthetic. Patient's abdomen was prepped and draped in usual sterile fashion. A Infraumbilical incision was performed with a #11 scalpel. Veress needle was introduced into the peritoneal cavity and a pneumoperitoneum was created. 8 mm millimeter trocar was introduced into the umbilical incision and a robotic scope was introduced. A 8 mm port was placed in the left lower abdomen. A 5 mm assist port was placed in the right lower abdomen and a 8 mm port was placed in the right lower lateral abdomen. The gallbladder was grasped and retracted over the liver edge. Biliary and vascular anatomy confirmed with ICG and Spy technology. The cystic duct was identified clipped and divided. The cystic artery and its branches were identified clipped and  divided. The gallbladder was removed from the liver edge by taking down its peritoneal attachments with hook electrocautery. Hemostasis was achieved with electrocautery. Gallbladder was then delivered through the umbilical incision. All ports were re-introduced into the abdominal cavity. The pneumoperitoneum was decompressed with suction and all ports removed. The umbilical fascia was closed with 0 Vicryl. All skin incisions were closed with 4-0 Vicryl in a subcuticular fashion.  Dermabond was then applied to the surface of the incision.  Patient tolerated the procedure well. The instrument and sponge count was correct after surgery.    Rich Gross MD  3/31/2025

## 2025-03-31 NOTE — ANESTHESIA POSTPROCEDURE EVALUATION
[facility]    Shreyas Stone Patient Status:  Hospital Outpatient Surgery   Age/Gender 85 year old male MRN [id number]   Location [facility] POST ANESTHESIA CARE UNIT Attending Rich Gross MD   Hosp Day # 0 PCP Adryan Rodarte MD       Anesthesia Post-op Note    ROBOTIC ASSISTED LAPAROSCOPIC CHOLECYSTECTOMY WITH INDOCYANINE GREEN,    Procedure Summary       Date: 03/31/25 Room / Location:  MAIN OR 08 /  MAIN OR    Anesthesia Start: 0957 Anesthesia Stop: 1106    Procedure: ROBOTIC ASSISTED LAPAROSCOPIC CHOLECYSTECTOMY WITH INDOCYANINE GREEN, (Abdomen) Diagnosis: (CHOLEDOCHOLITHLASIS, CHOLECYSTITIS)    Surgeons: Rich Gross MD Anesthesiologist: Bala Melton MD    Anesthesia Type: general ASA Status: 3            Anesthesia Type: general    Vitals Value Taken Time   /66 03/31/25 1145   Temp 97.2 °F (36.2 °C) 03/31/25 1105   Pulse 60 03/31/25 1145   Resp 11 03/31/25 1145   SpO2 92 % 03/31/25 1145   Vitals shown include unfiled device data.        Patient Location: PACU    Anesthesia Type: general    Airway Patency: patent    Postop Pain Control: adequate    Mental Status: preanesthetic baseline    Nausea/Vomiting: none    Cardiopulmonary/Hydration status: stable euvolemic    Complications: no apparent anesthesia related complications    Postop vital signs: stable    Dental Exam: Unchanged from Preop    Patient to be discharged from PACU when criteria met.

## 2025-03-31 NOTE — ANESTHESIA PROCEDURE NOTES
Airway  Date/Time: 3/31/2025 10:07 AM  Urgency: elective      General Information and Staff    Patient location during procedure: OR  Anesthesiologist: Bala Melton MD  Performed: anesthesiologist   Performed by: Bala Melton MD  Authorized by: Bala Melton MD      Indications and Patient Condition  Indications for airway management: anesthesia  Sedation level: deep  Preoxygenated: yes  Patient position: sniffing  Mask difficulty assessment: 1 - vent by mask    Final Airway Details  Final airway type: endotracheal airway      Successful airway: ETT  Cuffed: yes   Successful intubation technique: Video laryngoscopy  Endotracheal tube insertion site: oral  Blade: GlideScope  Blade size: #4  ETT size (mm): 7.5    Cormack-Lehane Classification: grade IIA - partial view of glottis  Placement verified by: capnometry   Measured from: lips  ETT to lips (cm): 21  Number of attempts at approach: 1

## 2025-03-31 NOTE — DISCHARGE INSTRUCTIONS
Home Care Instructions  CHOLECYSTECTOMY (JAYLENE)  Dr GISSEL Gross.    MEDICATIONS   You should anticipate moderate to severe pain for the first few days.   Your surgeon has prescribed for you a pain medication., usually Norco. Take the pain  medication as prescribed. You may use ibuprofen( Motrin ) 600 mg 3 times a day with the Norco or by itself if needed.   If you experience severe nausea or vomiting stop the pain medication and call our office.    DIET   Your diet should be as you tolerate. Eat light foods the first 24 hours.   Avoid high fat, greasy foods, milk products and cheese for the few days and slowly reintroduced these foods into your diet.   Do not drink alcohol while taking the pain medication    ACTIVITY   You should not drive for the first 3 to 5 days or if you are still requiring prescription pain medication.   No lifting greater than 10 to 15 pounds for 3 weeks   Avoid strenuous activity such as running and physical workouts for 3 weeks. Normal daily activities are fine, such as climbing stairs   You may shower after 24 hours.    You may return to work as instructed by your surgeon.    CARE OF SURGICAL SITE   Your incisions have glue on the surface. This will gradually come off over time.   Pain, colorful bruising and slight swelling at the incision sites is usually normal   If you experience fever, chills, inability to urinate, nausea with vomiting, severe diarrhea  or severe, cramping abdominal pain please contact your surgeon    APPOINTMENT   Call the office to make an appointment to see yoursurgeon in one week.   Should you develop any problems prior to your scheduled appointment, do not hesitate  to call the office.  [facility]  [phone]

## 2025-03-31 NOTE — ANESTHESIA PREPROCEDURE EVALUATION
PRE-OP EVALUATION    Patient Name: Shreyas Stone    Admit Diagnosis: CHOLEDOCHOLITHLASIS, CHOLECYSTITIS    Pre-op Diagnosis: CHOLEDOCHOLITHLASIS, CHOLECYSTITIS    ROBOTIC ASSISTED LAPAROSCOPIC CHOLECYSTECTOMY WITH INDOCYANINE GREEN, POSSIBLE OPEN    Anesthesia Procedure: ROBOTIC ASSISTED LAPAROSCOPIC CHOLECYSTECTOMY WITH INDOCYANINE GREEN, POSSIBLE OPEN    Surgeons and Role:     * Rich Gross MD - Primary    Pre-op vitals reviewed.  Temp: 97.7 °F (36.5 °C)  Pulse: 98  Resp: 18  BP: 150/69  SpO2: 99 %  Body mass index is 26.94 kg/m².    Current medications reviewed.  Hospital Medications:   [Transfer Hold] acetaminophen (Tylenol Extra Strength) tab 1,000 mg  1,000 mg Oral Once    [Transfer Hold] glucose (Dex4) 15 GM/59ML oral liquid 15 g  15 g Oral Q15 Min PRN    Or    [Transfer Hold] glucose (Glutose) 40% oral gel 15 g  15 g Oral Q15 Min PRN    Or    [Transfer Hold] glucose-vitamin C (Dex-4) chewable tab 4 tablet  4 tablet Oral Q15 Min PRN    Or    [Transfer Hold] dextrose 50% injection 50 mL  50 mL Intravenous Q15 Min PRN    Or    [Transfer Hold] glucose (Dex4) 15 GM/59ML oral liquid 30 g  30 g Oral Q15 Min PRN    Or    [Transfer Hold] glucose (Glutose) 40% oral gel 30 g  30 g Oral Q15 Min PRN    Or    [Transfer Hold] glucose-vitamin C (Dex-4) chewable tab 8 tablet  8 tablet Oral Q15 Min PRN    lactated ringers infusion   Intravenous Continuous    [COMPLETED] heparin (Porcine) 5000 UNIT/ML injection 5,000 Units  5,000 Units Subcutaneous Once    [COMPLETED] indocyanine green (IC-Green) injection 5 mg  5 mg Intravenous Once    ceFAZolin (Ancef) 2g in 10mL IV syringe premix  2 g Intravenous Once       Outpatient Medications:   Prescriptions Prior to Admission[1]    Allergies: Patient has no known allergies.      Anesthesia Evaluation    Patient summary reviewed.    Anesthetic Complications  (-) history of anesthetic complications         GI/Hepatic/Renal                                  Cardiovascular        Exercise tolerance: good     MET: >4      (+) hypertension     (-) CAD  (-) past MI  (-) CABG/stent                            Endo/Other      (+) diabetes   not using insulin                         Pulmonary                    (+) sleep apnea       Neuro/Psych          (-) CVA   (-) TIA                         Past Surgical History:   Procedure Laterality Date    Colonoscopy  2010    normal;     Colonoscopy  8-    divertic, int hem; repeat in 10 yrs    Other surgical history      circumcision    Upper gi endoscopy - referral  2010    H pylori    Upper gi endoscopy - referral  8-2014, HH    [facility]? Dr Ling     Social History     Socioeconomic History    Marital status:    Tobacco Use    Smoking status: Former     Current packs/day: 0.50     Average packs/day: 0.5 packs/day for 10.0 years (5.0 ttl pk-yrs)     Types: Cigarettes    Smokeless tobacco: Never    Tobacco comments:     quit over 20 years   Vaping Use    Vaping status: Never Used   Substance and Sexual Activity    Alcohol use: Yes     Comment: occasional    Drug use: No     History   Drug Use No     Available pre-op labs reviewed.  Lab Results   Component Value Date    WBC 5.8 03/20/2025    RBC 4.70 03/20/2025    HGB 14.3 03/20/2025    HCT 43.9 03/20/2025    MCV 93.4 03/20/2025    MCH 30.4 03/20/2025    MCHC 32.6 03/20/2025    RDW 13.1 03/20/2025    .0 03/20/2025               Airway      Mallampati: IV  Mouth opening: >3 FB  TM distance: 4 - 6 cm  Neck ROM: full Cardiovascular      Rhythm: regular       Dental    Dentition appears grossly intact         Pulmonary    Pulmonary exam normal.                 Other findings              ASA: 3   Plan: general  NPO status verified and     Post-procedure pain management plan discussed with surgeon and patient.      Plan/risks discussed with: patient            R/B/A were explained including but not limited to risk of aspiration, perioperative cardiac events, lip  gum or teeth injury, anaphylactic reaction, corneal abrasion, PONV, sore throat. All questions answered and the patient agreed to proceed.       Present on Admission:  **None**             [1]   Medications Prior to Admission   Medication Sig Dispense Refill Last Dose/Taking    Omega 3 1000 MG Oral Cap Take 1 capsule by mouth daily.   3/24/2025    Magnesium 200 MG Oral Tab Take 2 tablets (400 mg total) by mouth daily.   3/24/2025    Multiple Vitamins-Minerals (MULTI-VITAMIN/MINERALS) Oral Tab Take 1 tablet by mouth daily.   3/24/2025    ferrous sulfate 325 (65 FE) MG Oral Tab EC Take 1 tablet (325 mg total) by mouth daily with breakfast.   3/30/2025 Morning    rosuvastatin 5 MG Oral Tab Take one tablet by mouth every day for cholesterol 90 tablet 3 3/30/2025 Evening    LISINOPRIL 10 MG Oral Tab TAKE 1 TABLET BY MOUTH ONCE DAILY 90 tablet 0 3/30/2025 Morning    METFORMIN HCL 1000 MG Oral Tab TAKE 1 TABLET BY MOUTH  TWICE DAILY WITH MEALS 180 tablet 0 3/30/2025 Evening    PIOGLITAZONE 45 MG Oral Tab TAKE 1 TABLET BY MOUTH ONCE DAILY 90 tablet 0 3/30/2025 Evening    GLIPIZIDE 5 MG Oral Tab TAKE 1 TABLET BY MOUTH  TWICE DAILY BEFORE MEALS 180 tablet 0 3/30/2025 Evening    Cyanocobalamin (VITAMIN B-12) 2500 MCG Sublingual SL Tab Place 1 tablet under the tongue daily. 1/2 po daily.   3/30/2025 Morning    Esomeprazole Magnesium 20 MG Oral Capsule Delayed Release Take 1 capsule (20 mg total) by mouth.       FLUTICASONE PROPIONATE 50 MCG/ACT Nasal Suspension SHAKE LIQUID AND USE 2  SPRAYS IN BOTH NOSTRILS  ONCE DAILY (Patient taking differently: SHAKE LIQUID AND USE 2  SPRAYS IN BOTH NOSTRILS ONCE DAILY PRN) 48 g 0 Unknown    ACCU-CHEK SOFTCLIX LANCETS Does not apply Misc USE TO TEST BLOOD SUGAR TWICE DAILY 200 each 3     Glucose Blood (ACCU-CHEK KHRIS PLUS) In Vitro Strip USE TO TEST BLOOD SUGAR TWO TIMES DAILY 200 strip 3     Blood Glucose Monitoring Suppl (Kaiam CONTOUR NEXT MONITOR) W/DEVICE Does not apply Kit 1 each by  Does not apply route 2 (two) times daily. 1 kit 0

## 2025-04-10 RX ORDER — TAMSULOSIN HYDROCHLORIDE 0.4 MG/1
0.4 CAPSULE ORAL DAILY
COMMUNITY

## 2025-04-28 ENCOUNTER — ANESTHESIA EVENT (OUTPATIENT)
Dept: ENDOSCOPY | Facility: HOSPITAL | Age: 85
End: 2025-04-28
Payer: MEDICARE

## 2025-04-28 ENCOUNTER — HOSPITAL ENCOUNTER (OUTPATIENT)
Facility: HOSPITAL | Age: 85
Setting detail: HOSPITAL OUTPATIENT SURGERY
Discharge: HOME | End: 2025-04-28
Attending: INTERNAL MEDICINE | Admitting: INTERNAL MEDICINE
Payer: MEDICARE

## 2025-04-28 ENCOUNTER — ANESTHESIA (OUTPATIENT)
Dept: ENDOSCOPY | Facility: HOSPITAL | Age: 85
End: 2025-04-28
Payer: MEDICARE

## 2025-04-28 VITALS
HEART RATE: 64 BPM | HEIGHT: 66 IN | SYSTOLIC BLOOD PRESSURE: 145 MMHG | RESPIRATION RATE: 16 BRPM | WEIGHT: 170 LBS | OXYGEN SATURATION: 98 % | TEMPERATURE: 98 F | DIASTOLIC BLOOD PRESSURE: 65 MMHG | BODY MASS INDEX: 27.32 KG/M2

## 2025-04-28 LAB — GLUCOSE BLD-MCNC: 151 MG/DL (ref 70–99)

## 2025-04-28 PROCEDURE — 82962 GLUCOSE BLOOD TEST: CPT

## 2025-04-28 RX ORDER — HYDROMORPHONE HYDROCHLORIDE 1 MG/ML
0.4 INJECTION, SOLUTION INTRAMUSCULAR; INTRAVENOUS; SUBCUTANEOUS EVERY 5 MIN PRN
Refills: 0 | OUTPATIENT
Start: 2025-04-28 | End: 2025-04-28

## 2025-04-28 RX ORDER — ONDANSETRON 2 MG/ML
4 INJECTION INTRAMUSCULAR; INTRAVENOUS EVERY 6 HOURS PRN
OUTPATIENT
Start: 2025-04-28

## 2025-04-28 RX ORDER — 0.9 % SODIUM CHLORIDE 0.9 %
INTRAVENOUS SOLUTION INTRAVENOUS
Status: DISCONTINUED
Start: 2025-04-28 | End: 2025-04-28

## 2025-04-28 RX ORDER — NICOTINE POLACRILEX 4 MG
30 LOZENGE BUCCAL
Status: DISCONTINUED | OUTPATIENT
Start: 2025-04-28 | End: 2025-04-28

## 2025-04-28 RX ORDER — DEXTROSE MONOHYDRATE 25 G/50ML
50 INJECTION, SOLUTION INTRAVENOUS
Status: DISCONTINUED | OUTPATIENT
Start: 2025-04-28 | End: 2025-04-28

## 2025-04-28 RX ORDER — INDOMETHACIN 100 MG
SUPPOSITORY, RECTAL RECTAL
Status: DISCONTINUED
Start: 2025-04-28 | End: 2025-04-28

## 2025-04-28 RX ORDER — SODIUM CHLORIDE, SODIUM LACTATE, POTASSIUM CHLORIDE, CALCIUM CHLORIDE 600; 310; 30; 20 MG/100ML; MG/100ML; MG/100ML; MG/100ML
INJECTION, SOLUTION INTRAVENOUS CONTINUOUS
Status: DISCONTINUED | OUTPATIENT
Start: 2025-04-28 | End: 2025-04-28

## 2025-04-28 RX ORDER — NALOXONE HYDROCHLORIDE 0.4 MG/ML
0.08 INJECTION, SOLUTION INTRAMUSCULAR; INTRAVENOUS; SUBCUTANEOUS AS NEEDED
OUTPATIENT
Start: 2025-04-28 | End: 2025-04-28

## 2025-04-28 RX ORDER — HYDROMORPHONE HYDROCHLORIDE 1 MG/ML
0.2 INJECTION, SOLUTION INTRAMUSCULAR; INTRAVENOUS; SUBCUTANEOUS EVERY 5 MIN PRN
Refills: 0 | OUTPATIENT
Start: 2025-04-28 | End: 2025-04-28

## 2025-04-28 RX ORDER — INDOMETHACIN 100 MG
100 SUPPOSITORY, RECTAL RECTAL ONCE
Status: COMPLETED | OUTPATIENT
Start: 2025-04-28 | End: 2025-04-28

## 2025-04-28 RX ORDER — SODIUM CHLORIDE, SODIUM LACTATE, POTASSIUM CHLORIDE, CALCIUM CHLORIDE 600; 310; 30; 20 MG/100ML; MG/100ML; MG/100ML; MG/100ML
INJECTION, SOLUTION INTRAVENOUS CONTINUOUS
OUTPATIENT
Start: 2025-04-28

## 2025-04-28 RX ORDER — HYDROMORPHONE HYDROCHLORIDE 1 MG/ML
0.6 INJECTION, SOLUTION INTRAMUSCULAR; INTRAVENOUS; SUBCUTANEOUS EVERY 5 MIN PRN
Refills: 0 | OUTPATIENT
Start: 2025-04-28 | End: 2025-04-28

## 2025-04-28 RX ORDER — AMPICILLIN SODIUM AND SULBACTAM SODIUM 2; 1 G/1; G/1
INJECTION, POWDER, FOR SOLUTION INTRAVENOUS
Status: DISCONTINUED
Start: 2025-04-28 | End: 2025-04-28

## 2025-04-28 RX ORDER — METOCLOPRAMIDE HYDROCHLORIDE 5 MG/ML
10 INJECTION INTRAMUSCULAR; INTRAVENOUS EVERY 8 HOURS PRN
OUTPATIENT
Start: 2025-04-28

## 2025-04-28 RX ORDER — NICOTINE POLACRILEX 4 MG
15 LOZENGE BUCCAL
Status: DISCONTINUED | OUTPATIENT
Start: 2025-04-28 | End: 2025-04-28

## 2025-04-28 RX ORDER — SODIUM CHLORIDE, SODIUM LACTATE, POTASSIUM CHLORIDE, CALCIUM CHLORIDE 600; 310; 30; 20 MG/100ML; MG/100ML; MG/100ML; MG/100ML
INJECTION, SOLUTION INTRAVENOUS CONTINUOUS PRN
Status: DISCONTINUED | OUTPATIENT
Start: 2025-04-28 | End: 2025-04-28 | Stop reason: SURG

## 2025-04-28 RX ADMIN — SODIUM CHLORIDE, SODIUM LACTATE, POTASSIUM CHLORIDE, CALCIUM CHLORIDE: 600; 310; 30; 20 INJECTION, SOLUTION INTRAVENOUS at 11:07:00

## 2025-04-28 NOTE — OPERATIVE REPORT
[facility] OPERATIVE REPORT   PATIENT NAME: Shreyas Stone  MRN: [id number]  DATE OF OPERATION: 4/28/2025  PREOPERATIVE DIAGNOSIS: history of choledocholithiasis and stent placement to prevent post stone extraction biliary obstruction from edema  POSTOPERATIVE DIAGNOSIS:    1.  Duodenal spasm, periampullary diverticulum   2.  Previous biliary stent was occluded and removed   3.  Moderate sludge removed with balloon sweep   4.  Patulous biliary orifice post sphincterotomy   PROCEDURE PERFORMED: Endoscopic Retrograde Cholangiopancreatoscopy   SEDATION MEDICATIONS: MAC  PREOPERATIVE MEDS: Unasyn 3gm IVPB;  500cc lactated Ringer's solution IV  INTRAPROCEDURE MEDS:  indomethacin 100 mg Per rectum  PREPROCEDURE ASSESSMENT: The indication for this procedure is to assess for biliary obstruction. The patient was identified by myself and nursing staff in the exam room. Informed consent was obtained. The patient was seen in clinic and a full H&P was obtained. On brief physical examination, airway is patent. Chest is clear. Heart has regular rate and rhythm. Abdomen is soft, nontender with good bowel sounds. A medication list was taken by nursing today and reviewed by myself. The patient is an ASA grade 2.   PROCEDURE NOTE: The procedure was completed without difficulty. The patient tolerated the procedure well.   The side-viewing duodenoscope was introduced into the esophagus and advanced to the 2nd portion.  Ampulla was visualized and appeared normal. Periampullary diverticulum was noted. Biliary stent appeared occluded. Wire was advanced next to the stent using 9-12mm extraction balloon with preloaded with 035\" guidewire. Stent was removed throuhg the scope with a snare. Occlusion cholangiogram showed linear filling defects c/w sludge. Balloon sweep resulted in reoval of moderate sludge. 12mm balloon came out freely.  CBD was dilated to 12mm. Intrahepatic ducts were noted. No bile leak was noted. No contrast was  injected into the pancreatic duct. Excellent biliary drainage was noted. Scope was withdrawn from the patient and patient tolerated the procedure well.  FINDINGS   Biliary stent and sludge removed  RECOMMENDATIONS: no follow up with me needed  DISCHARGE:  On discharge, the patient was given an after-visit summary detailing the procedure, findings, followup plans, and an updated medication list.     Thank you very much for the consultation.  I really appreciate it.    Severino Mittal MD

## 2025-04-28 NOTE — H&P
History & Physical Examination    Patient Name: Shreyas Stone  MRN: [id number]  [facility]: [id number]  YOB: 1940    Diagnosis: CHOLEDOCHOLITHIASIS        Present Illness:  Shreyas Stone is a 85 year old male is here CHOLEDOCHOLITHIASIS.    Body mass index is 27.44 kg/m².    Past Medical History[1]    Procedure: ERCP    Physician Pre-Sedation Assessment    Pre-Sedation Assessment:    Sedation History: Airway Assessed    ASA Classification: 2. Patient with mild systemic disease    Cardiac:    Respiratory:    Abdomen:      Plan: [facility] [place] [facility]        Current Hospital Medications[2]    Allergies: Allergies[3]    Past Surgical History[4]  Family History[5]  Social History     Tobacco Use    Smoking status: Former     Current packs/day: 0.50     Average packs/day: 0.5 packs/day for 10.0 years (5.0 ttl pk-yrs)     Types: Cigarettes    Smokeless tobacco: Never    Tobacco comments:     quit over 20 years   Substance Use Topics    Alcohol use: Yes     Comment: occasional       SYSTEM Check if Review is Normal Check if Physical Exam is Normal If not normal, please explain:   HEENT [x ] [x ]    NECK & BACK [x ] [x ]    HEART [x ] [x ]    LUNGS [x ] [x ]    ABDOMEN [x ] [x ]    UROGENITAL [ ] [ ]    EXTREMITIES [ ] [ ]    OTHER        [ x ] I have discussed the risks and benefits and alternatives with the patient/family.  They understand and agree to proceed with plan of care.  [ x ] I have reviewed the History and Physical done within the last 30 days.  Any changes noted above.    Severino Mittal MD  4/28/2025  1:23 PM             [1]   Past Medical History:   Anxiety state    Calculus of kidney    DIABETES    Diabetes (HCC)    Erectile dysfunction    GERD (gastroesophageal reflux disease)    [facility]? Dr Ling    Hearing impaired person, bilateral    left side hearing loss    High blood pressure    High cholesterol    History of anemia    iron def - etiology ?    HYPERLIPIDEMIA    Hypertension    Impotence of  organic origin    Iron deficiency anemia, unspecified    Kidney stone on right side    Restless leg syndrome    Re[facility] [place]    SLEEP APNEA    Sleep apnea    no device or oral appliance    Visual impairment    glasses    Vitamin B12 deficiency neuropathy (HCC)   [2]   Current Facility-Administered Medications   Medication Dose Route Frequency    glucose (Dex4) 15 GM/59ML oral liquid 15 g  15 g Oral Q15 Min PRN    Or    glucose (Glutose) 40% oral gel 15 g  15 g Oral Q15 Min PRN    Or    glucose-vitamin C (Dex-4) chewable tab 4 tablet  4 tablet Oral Q15 Min PRN    Or    dextrose 50% injection 50 mL  50 mL Intravenous Q15 Min PRN    Or    glucose (Dex4) 15 GM/59ML oral liquid 30 g  30 g Oral Q15 Min PRN    Or    glucose (Glutose) 40% oral gel 30 g  30 g Oral Q15 Min PRN    Or    glucose-vitamin C (Dex-4) chewable tab 8 tablet  8 tablet Oral Q15 Min PRN    lactated ringers infusion   Intravenous Continuous    lactated ringers IV bolus 1,000 mL  1,000 mL Intravenous Once    ampicillin-sulbactam (Unasyn) 3 (2-1) g injection        indomethacin (Indocin) 100 MG rectal suppository        sodium chloride 0.9% IVPB       [3] No Known Allergies  [4]   Past Surgical History:  Procedure Laterality Date    Cholecystectomy      Colonoscopy  01/01/2010    normal;     Colonoscopy  08/11/2014    divertic, int hem; repeat in 10 yrs    Colonoscopy      Ercp,diagnostic      Other surgical history      circumcision    Upper gi endoscopy - referral  01/01/2010    H pylori    Upper gi endoscopy - referral  8-2014, HH    [facility]? Dr Ling    Upper gi endoscopy,exam     [5]   Family History  Problem Relation Age of Onset    Diabetes Maternal Grandmother     Diabetes Maternal Grandfather     Diabetes Other     Cancer Neg     Hypertension Neg     Lipids Neg     Heart Disorder Neg     Stroke Neg

## 2025-04-28 NOTE — ANESTHESIA PREPROCEDURE EVALUATION
PRE-OP EVALUATION    Patient Name: Shreyas Stone    Admit Diagnosis: CHOLEDOCHOLITHIASIS    Pre-op Diagnosis: CHOLEDOCHOLITHIASIS    ENDOSCOPIC RETROGRADE CHOLANGIOPANCREATOGRAPHY (ERCP) WITH FLUOROSCOPY AND STENT REMOVAL    Anesthesia Procedure: ENDOSCOPIC RETROGRADE CHOLANGIOPANCREATOGRAPHY (ERCP) WITH FLUOROSCOPY AND STENT REMOVAL    Surgeons and Role:     * Severino Mittal MD - Primary    Pre-op vitals reviewed.        Body mass index is 27.44 kg/m².    Current medications reviewed.  Hospital Medications:  Current Medications[1]    Outpatient Medications:   Prescriptions Prior to Admission[2]    Allergies: Patient has no known allergies.      Anesthesia Evaluation    Patient summary reviewed.    Anesthetic Complications           GI/Hepatic/Renal      (+) GERD                           Cardiovascular      ECG reviewed.            (+) hypertension   (+) hyperlipidemia                                  Endo/Other      (+) diabetes                            Pulmonary                    (+) sleep apnea       Neuro/Psych                 (+) neuromuscular disease                     Past Surgical History[3]  Social Hx on file[4]  History   Drug Use No     Available pre-op labs reviewed.  Lab Results   Component Value Date    WBC 5.8 03/20/2025    RBC 4.70 03/20/2025    HGB 14.3 03/20/2025    HCT 43.9 03/20/2025    MCV 93.4 03/20/2025    MCH 30.4 03/20/2025    MCHC 32.6 03/20/2025    RDW 13.1 03/20/2025    .0 03/20/2025               Airway      Mallampati: II  Mouth opening: >3 FB  TM distance: > 6 cm  Neck ROM: full Cardiovascular    Cardiovascular exam normal.         Dental             Pulmonary    Pulmonary exam normal.                 Other findings              ASA: 2   Plan: MAC  NPO status verified and         Comment:   Plan is MAC anesthesia, which likely will include deep sedation.  Implied that memory of procedure is unlikely although intraop recall, if it occurs, may be a reasonable and  comfortable experience with this anesthetic.  Aware that general anesthesia is not intended though deep sedation may include brief moments of general anesthesia.   Questions answered. Accepts. The consent was signed without further questions.   Plan/risks discussed with: patient                Present on Admission:  **None**             [1]    [COMPLETED] heparin (Porcine) 5000 UNIT/ML injection 5,000 Units  5,000 Units Subcutaneous Once    [COMPLETED] indocyanine green (IC-Green) injection 5 mg  5 mg Intravenous Once    [COMPLETED] ceFAZolin (Ancef) 2g in 10mL IV syringe premix  2 g Intravenous Once    [COMPLETED] insulin aspart (NovoLOG) 100 Units/mL vial 1-5 Units  1-5 Units Subcutaneous Once   [2]   No medications prior to admission.   [3]   Past Surgical History:  Procedure Laterality Date    Cholecystectomy      Colonoscopy  01/01/2010    normal;     Colonoscopy  08/11/2014    divertic, int hem; repeat in 10 yrs    Colonoscopy      Ercp,diagnostic      Other surgical history      circumcision    Upper gi endoscopy - referral  01/01/2010    H pylori    Upper gi endoscopy - referral  8-2014, HH    [facility]? Dr Ling    Upper gi endoscopy,exam     [4]   Social History  Socioeconomic History    Marital status:    Tobacco Use    Smoking status: Former     Current packs/day: 0.50     Average packs/day: 0.5 packs/day for 10.0 years (5.0 ttl pk-yrs)     Types: Cigarettes    Smokeless tobacco: Never    Tobacco comments:     quit over 20 years   Vaping Use    Vaping status: Never Used   Substance and Sexual Activity    Alcohol use: Yes     Comment: occasional    Drug use: No

## 2025-04-28 NOTE — DISCHARGE INSTRUCTIONS
Home Care Instructions for Gastroscopy with Sedation    Diet:  - Resume your regular diet as tolerated unless otherwise instructed.  - Start with light meals to minimize bloating.  - Do not drink alcohol today.    Medication:  - If you have questions about resuming your normal medications, please contact your Primary Care Physician.    Activities:  - Take it easy today. Do not return to work today.  - Do not drive today.  - Do not operate any machinery today (including kitchen equipment).    Gastroscopy:  - You may have a sore throat for 2-3 days following the exam. This is normal. Gargling with warm salt water (1/2 tsp salt to 1 glass warm water) or using throat lozenges will help.  - If you experience any sharp pain in your neck, abdomen or chest, vomiting of blood, oral temperature over 100 degrees Fahrenheit, light-headedness or dizziness, or any other problems, contact your doctor.    **If unable to reach your doctor, please go to the [facility] Emergency Room**    - Your referring physician will receive a full report of your examination.  - If you do not hear from your doctor's office within two weeks of your biopsy, please call them for your results.    You may be able to see your laboratory results in uBeam between 4 and 7 business days.  In some cases, your physician may not have viewed the results before they are released to uBeam.  If you have questions regarding your results contact the physician who ordered the test/exam by phone or via uBeam by choosing \"Ask a Medical Question.\"

## 2025-04-28 NOTE — ANESTHESIA POSTPROCEDURE EVALUATION
[facility]    Shreyas Stone Patient Status:  Hospital Outpatient Surgery   Age/Gender 85 year old male MRN [id number]   Location [facility] END[facility] Attending Severino Mittal MD   Hosp Day # 0 PCP Adryan Rodarte MD       Anesthesia Post-op Note    ENDOSCOPIC RETROGRADE CHOLANGIOPANCREATOGRAPHY (ERCP) WITH FLUOROSCOPY AND STENT REMOVAL, balloon sweep    Procedure Summary       Date: 04/28/25 Room / Location:  ENDOSCOPY 03 / [facility]    Anesthesia Start: 1102 Anesthesia Stop: 1128    Procedure: ENDOSCOPIC RETROGRADE CHOLANGIOPANCREATOGRAPHY (ERCP) WITH FLUOROSCOPY AND STENT REMOVAL, balloon sweep Diagnosis: (CHOLEDOCHOLITHIASIS)    Surgeons: Severino Mittal MD Anesthesiologist: Bon Rivera DO    Anesthesia Type: MAC ASA Status: 2            Anesthesia Type: MAC    Vitals Value Taken Time   /50 04/28/25 11:28   Temp 97 04/28/25 11:31   Pulse 63 04/28/25 11:31   Resp 16 04/28/25 11:31   SpO2 98 % 04/28/25 11:31   Vitals shown include unfiled device data.        Patient Location: Endoscopy    Anesthesia Type: MAC    Airway Patency: patent    Postop Pain Control: adequate    Mental Status: preanesthetic baseline    Nausea/Vomiting: none    Cardiopulmonary/Hydration status: stable euvolemic    Complications: no apparent anesthesia related complications    Postop vital signs: stable    Dental Exam: Unchanged from Preop

## (undated) DEVICE — ACROBAT 2 CALIBRATED TIP WIRE GUIDE: Brand: ACROBAT

## (undated) DEVICE — LASSO POLYPECTOMY SNARE: Brand: LASSO

## (undated) DEVICE — 3M™ RED DOT™ MONITORING ELECTRODE WITH FOAM TAPE AND STICKY GEL, 50/BAG, 20/CASE, 72/PLT 2570: Brand: RED DOT™

## (undated) DEVICE — ROBOTIC GENERAL: Brand: MEDLINE INDUSTRIES, INC.

## (undated) DEVICE — KIT CUSTOM ENDOPROCEDURE STERIS

## (undated) DEVICE — WIREGUIDED RETRIEVAL BASKET: Brand: TRAPEZOID RX

## (undated) DEVICE — GLOVE,SURG,SENSICARE,ALOE,LF,PF,7: Brand: MEDLINE

## (undated) DEVICE — LAPAROVUE VISIBILITY SYSTEM LAPAROSCOPIC SOLUTIONS: Brand: LAPAROVUE

## (undated) DEVICE — DEVICE INFL 60ML 15ATM PRSS COOK SPHR

## (undated) DEVICE — 1200CC GUARDIAN II: Brand: GUARDIAN

## (undated) DEVICE — DISPOSABLE GRASPER: Brand: EPIX LAPAROSCOPIC GRASPER

## (undated) DEVICE — FUSION TITAN BILIARY DILATION BALLOON 3.2MM: Brand: FUSION TITAN

## (undated) DEVICE — SNAPLOC WIRE GUIDE LOCKING DEVICE: Brand: SNAPLOC

## (undated) DEVICE — CANNULATING SPHINCTEROTOME: Brand: AUTOTOME™ RX 44

## (undated) DEVICE — SEAL

## (undated) DEVICE — SURGICEL SNOW HEMOSTAT 4X4IN ABSORBABLE

## (undated) DEVICE — POUCH SPECIMEN WIRE 6X3 250ML

## (undated) DEVICE — UNDYED BRAIDED (POLYGLACTIN 910), SYNTHETIC ABSORBABLE SUTURE: Brand: COATED VICRYL

## (undated) DEVICE — FIRSTSTEP 200ML READY2USE GEMI

## (undated) DEVICE — 40580 - THE PINK PAD - ADVANCED TRENDELENBURG POSITIONING KIT: Brand: 40580 - THE PINK PAD - ADVANCED TRENDELENBURG POSITIONING KIT

## (undated) DEVICE — OASIS, ONE ACTION STENT INTRODUCTION SYSTEM: Brand: OASIS

## (undated) DEVICE — RETRIEVAL BALLOON CATHETER: Brand: EXTRACTOR™ PRO RX

## (undated) DEVICE — PERMANENT CAUTERY HOOK: Brand: ENDOWRIST

## (undated) DEVICE — ENDOPATH ULTRA VERESS INSUFFLATION NEEDLES WITH LUER LOCK CONNECTORS: Brand: ENDOPATH

## (undated) DEVICE — DAVINCI XI CLIP HEM O LOK LARGE PURPLE

## (undated) DEVICE — 10FT COMBINED O2 DELIVERY/CO2 MONITORING. FILTER WITH MICROSTREAM TYPE LUER: Brand: DUAL ADULT NASAL CANNULA

## (undated) DEVICE — TROCAR: Brand: KII SHIELDED BLADED ACCESS SYSTEM

## (undated) DEVICE — V2 SPECIMEN COLLECTION MANIFOLD KIT: Brand: NEPTUNE

## (undated) DEVICE — PROGRASP FORCEPS: Brand: ENDOWRIST

## (undated) DEVICE — KIT VLV 5 PC AIR H2O SUCT BX ENDOGATOR CONN

## (undated) DEVICE — BITEBLOCK ENDOSCP 60FR MAXI STRP

## (undated) DEVICE — BLADELESS OBTURATOR: Brand: WECK VISTA

## (undated) DEVICE — ADHESIVE SKIN TOP FOR WND CLSR DERMBND ADV

## (undated) DEVICE — TROCAR: Brand: KII FIOS FIRST ENTRY

## (undated) DEVICE — LARGE HEM-O-LOK CLIP APPLIER: Brand: ENDOWRIST

## (undated) DEVICE — SUT COAT VCRL+ 0 27IN UR-6 ABSRB VLT ANTIBACT

## (undated) DEVICE — STERILE DRAPE FOR USE WITH SITUATE ROOM SCANNER: Brand: SITUATE

## (undated) DEVICE — SINGLE USE DISTAL COVER MAJ-2315: Brand: SINGLE USE DISTAL COVER

## (undated) DEVICE — COLUMN DRAPE

## (undated) DEVICE — 2, DISPOSABLE SUCTION/IRRIGATOR WITHOUT DISPOSABLE TIP: Brand: STRYKEFLOW

## (undated) DEVICE — GLOVE SUR 7.5 SENSICARE PI PIP CRM PWD F

## (undated) DEVICE — COVER,LIGHT,CAMERA,HARD,1/PK,STRL: Brand: MEDLINE

## (undated) DEVICE — ARM DRAPE

## (undated) NOTE — LETTER
Patient Name: Shreyas Stone-Age / Sex: 3/13/1940-A: 85 y  male   Medical Records: [id number] CSN: [id number]    ABNORMAL VALUES  Surgeon(s):  Rich Gross MD  Anesthesia Type: General  Date of Surgery: 3/31/2025  Procedure Description: ROBOTIC ASSISTED LAPAROSCOPIC CHOLECYSTECTOMY WITH INDOCYANINE GREEN, POSSIBLE OPEN  Primary Surgeon:  Rich Gross MD  Phone Number: [phone]    PLEASE NOTE THE FOLLOWING ABNORMALITIES:    Please see CMP dated 2025         Thank you,   Pre Admission Testing

## (undated) NOTE — LETTER
[address]  [address]  Authorization for Surgical Operation and Procedure     Date:___________                                                                                                         Time:__________  I hereby authorize Surgeon(s):  Severino Mittal MD, my physician and his/her assistants (if applicable), which may include medical students, residents, and/or fellows, to perform the following surgical operation/ procedure and administer such anesthesia as may be determined necessary by my physician:  Operation/Procedure name (s) Procedure(s):  ENDOSCOPIC RETROGRADE CHOLANGIOPANCREATOGRAPHY (ERCP) WITH FLUOROSCOPY  ENDOSCOPIC ULTRASOUND (EUS) WITH RENDEZVOUS on Shreyas Stone   2.   I recognize that during the surgical operation/procedure, unforeseen conditions may necessitate additional or different procedures than those listed above.  I, therefore, further authorize and request that the above-named surgeon, assistants, or designees perform such procedures as are, in their judgment, necessary and desirable.    3.   My surgeon/physician has discussed prior to my surgery the potential benefits, risks and side effects of this procedure; the likelihood of achieving goals; and potential problems that might occur during recuperation.  They also discussed reasonable alternatives to the procedure, including risks, benefits, and side effects related to the alternatives and risks related to not receiving this procedure.  I have had all my questions answered and I acknowledge that no guarantee has been made as to the result that may be obtained.    4.   Should the need arise during my operation/procedure, which includes change of level of care prior to discharge, I also consent to the administration of blood and/or blood products.  Further, I understand that despite careful testing and screening of blood or blood products by collecting agencies, I may still be subject to  ill effects as a result of receiving a blood transfusion and/or blood products.  The following are some, but not all, of the potential risks that can occur: fever and allergic reactions, hemolytic reactions, transmission of diseases such as Hepatitis, AIDS and Cytomegalovirus (CMV) and fluid overload.  In the event that I wish to have an autologous transfusion of my own blood, or a directed donor transfusion, I will discuss this with my physician.  Check only if Refusing Blood or Blood Products  I understand refusal of blood or blood products as deemed necessary by my physician may have serious consequences to my condition to include possible death. I hereby assume responsibility for my refusal and release the hospital, its personnel, and my physicians from any responsibility for the consequences of my refusal.          o  Refuse      5.   I authorize the use of any specimen, organs, tissues, body parts or foreign objects that may be removed from my body during the operation/procedure for diagnosis, research or teaching purposes and their subsequent disposal by hospital authorities.  I also authorize the release of specimen test results and/or written reports to my treating physician on the hospital medical staff or other referring or consulting physicians involved in my care, at the discretion of the Pathologist or my treating physician.    6.   I consent to the photographing or videotaping of the operations or procedures to be performed, including appropriate portions of my body for medical, scientific, or educational purposes, provided my identity is not revealed by the pictures or by descriptive texts accompanying them.  If the procedure has been photographed/videotaped, the surgeon will obtain the original picture, image, videotape or CD.  The hospital will not be responsible for storage, release or maintenance of the picture, image, tape or CD.    7.   I consent to the presence of a  or  observers in the operating room as deemed necessary by my physician or their designees.    8.   I recognize that in the event my procedure results in extended X-Ray/fluoroscopy time, I may develop a skin reaction.    9. If I have a Do Not Attempt Resuscitation (DNAR) order in place, that status will be suspended while in the operating room, procedural suite, and during the recovery period unless otherwise explicitly stated by me (or a person authorized to consent on my behalf). The surgeon or my attending physician will determine when the applicable recovery period ends for purposes of reinstating the DNAR order.  10. Patients having a sterilization procedure: I understand that if the procedure is successful the results will be permanent and it will therefore be impossible for me to inseminate, conceive, or bear children.  I also understand that the procedure is intended to result in sterility, although the result has not been guaranteed.   11. I acknowledge that my physician has explained sedation/analgesia administration to me including the risk and benefits I consent to the administration of sedation/analgesia as may be necessary or desirable in the judgment of my physician.    I CERTIFY THAT I HAVE READ AND FULLY UNDERSTAND THE ABOVE CONSENT TO OPERATION and/or OTHER PROCEDURE.    _________________________________________  __________________________________  Signature of Patient     Signature of Responsible Person         ___________________________________         Printed Name of Responsible Person           _________________________________                 Relationship to Patient  _________________________________________  ______________________________  Signature of Witness          Date  Time      Patient Name: Shreyas Stone     : 3/13/1940                 Printed: 2025     Medical Record #: [id number]                     Page 1 of 2                                    [address]  [place]  [address]    Consent for Anesthesia    I, Shreyas PULIDO Chase agree to be cared for by an anesthesiologist, who is specially trained to monitor me and give me medicine to put me to sleep or keep me comfortable during my procedure    I understand that my anesthesiologist is not an employee or agent of [facility] or [employer]. He or she works for [employer].    As the patient asking for anesthesia services, I agree to:  Allow the anesthesiologist (anesthesia doctor) to give me medicine and do additional procedures as necessary. Some examples are: Starting or using an “IV” to give me medicine, fluids or blood during my procedure, and having a breathing tube placed to help me breathe when I’m asleep (intubation). In the event that my heart stops working properly, I understand that my anesthesiologist will make every effort to sustain my life, unless otherwise directed by [facility] Do Not Resuscitate documents.  Tell my anesthesia doctor before my procedure:  If I am pregnant.  The last time that I ate or drank.  All of the medicines I take (including prescriptions, herbal supplements, and pills I can buy without a prescription (including street drugs/illegal medications). Failure to inform my anesthesiologist about these medicines may increase my risk of anesthetic complications.  If I am allergic to anything or have had a reaction to anesthesia before.  I understand how the anesthesia medicine will help me (benefits).  I understand that with any type of anesthesia medicine there are risks:  The most common risks are: nausea, vomiting, sore throat, muscle soreness, damage to my eyes, mouth, or teeth (from breathing tube placement).  Rare risks include: remembering what happened during my procedure, allergic reactions to medications, injury to my airway, heart, lungs, vision, nerves, or muscles and in extremely rare instances death.  My doctor has explained  to me other choices available to me for my care (alternatives).  Pregnant Patients (“epidural”):  I understand that the risks of having an epidural (medicine given into my back to help control pain during labor), include itching, low blood pressure, difficulty urinating, headache or slowing of the baby’s heart. Very rare risks include infection, bleeding, seizure, irregular heart rhythms and nerve injury.  Regional Anesthesia (“spinal”, “epidural”, & “nerve blocks”):  I understand that rare but potential complications include headache, bleeding, infection, seizure, irregular heart rhythms, and nerve injury.    I can change my mind about having anesthesia services at any time before I get the medicine.    _____________________________________________________________________________  Patient (or Representative) Signature/Relationship to Patient  Date   Time    _____________________________________________________________________________   Name (if used)    Language/Organization   Time    _____________________________________________________________________________  Anesthesiologist Signature     Date   Time  I have discussed the procedure and information above with the patient (or patient’s representative) and answered their questions. The patient or their representative has agreed to have anesthesia services.    _____________________________________________________________________________  Witness        Date   Time  I have verified that the signature is that of the patient or patient’s representative, and that it was signed before the procedure  Patient Name: Srheyas PULIDO Chase     : 3/13/1940                 Printed: 2025     Medical Record #: [id number]                     Page 2 of 2